# Patient Record
Sex: FEMALE | Race: WHITE | NOT HISPANIC OR LATINO | Employment: FULL TIME | URBAN - METROPOLITAN AREA
[De-identification: names, ages, dates, MRNs, and addresses within clinical notes are randomized per-mention and may not be internally consistent; named-entity substitution may affect disease eponyms.]

---

## 2020-03-10 ENCOUNTER — OFFICE VISIT (OUTPATIENT)
Dept: URGENT CARE | Facility: CLINIC | Age: 45
End: 2020-03-10
Payer: COMMERCIAL

## 2020-03-10 VITALS
HEART RATE: 92 BPM | RESPIRATION RATE: 16 BRPM | SYSTOLIC BLOOD PRESSURE: 128 MMHG | OXYGEN SATURATION: 100 % | WEIGHT: 183 LBS | DIASTOLIC BLOOD PRESSURE: 92 MMHG | TEMPERATURE: 98.6 F

## 2020-03-10 DIAGNOSIS — J18.9 COMMUNITY ACQUIRED PNEUMONIA, UNSPECIFIED LATERALITY: Primary | ICD-10-CM

## 2020-03-10 PROCEDURE — 99203 OFFICE O/P NEW LOW 30 MIN: CPT | Performed by: PHYSICIAN ASSISTANT

## 2020-03-10 RX ORDER — AZITHROMYCIN 250 MG/1
TABLET, FILM COATED ORAL
Qty: 6 TABLET | Refills: 0 | Status: SHIPPED | OUTPATIENT
Start: 2020-03-10 | End: 2020-03-14

## 2020-03-10 RX ORDER — CYCLOBENZAPRINE HCL 5 MG
TABLET ORAL
COMMUNITY
Start: 2020-02-20

## 2020-03-10 RX ORDER — FLUTICASONE FUROATE 27.5 UG/1
SPRAY, METERED NASAL
COMMUNITY
Start: 2020-02-06

## 2020-03-10 RX ORDER — BENZONATATE 100 MG/1
200 CAPSULE ORAL 3 TIMES DAILY PRN
Qty: 30 CAPSULE | Refills: 0 | Status: SHIPPED | OUTPATIENT
Start: 2020-03-10

## 2020-03-10 NOTE — PROGRESS NOTES
[facility]        NAME: Claudia Alas is a 41 y o  female  : 1975    MRN: [id number]  DATE: March 10, 2020  TIME: 7:15 PM    Assessment and Plan   Community acquired pneumonia, unspecified laterality [J18 9]  1  Community acquired pneumonia, unspecified laterality  azithromycin (ZITHROMAX) 250 mg tablet    benzonatate (TESSALON PERLES) 100 mg capsule         Patient Instructions   CAP:   -There is diffuse mild crackles heard on exam concerning for CAP  Will treat as per clinical evaluation with Azithromycin 250mg taken as prescribed  Take with food and a probiotic    -Fluticasone Nasal Spray for PND and congestion  Tessalon perles for the cough  -You can use OTC zyrtec or claritin  You can OTC mucinex  -Use a humidifier next to your bed  Take steam showers  -You can take Advil or Tylenol for fever or pain  -Stay very well hydrated and rest   -If your symptoms persist or worsen follow up immediately with your PCP  If your sx become acutely worse go to the ED  Follow up with PCP in 3-5 days  Proceed to  ER if symptoms worsen  Chief Complaint     Chief Complaint   Patient presents with    Cold Like Symptoms     pt presnts with productive cough, stuffy nose, chest congestion, started on Saturday         History of Present Illness       The patient presents today for a three day hx of productive cough, rhinorrhea, nasal congestion  The patient states that her sx began with a dry cough, rhinorrhea, PND and congestion  She states that the cough is now productive of a yellow sputum  She denies fever, chills, myalgias, dyspnea, wheezing, chest pain, chest tightness, palpitations  She denies sick contacts or recent travel  She denies a hx of asthma or smoking  She has lanre taking mucinex and tylenol cold and sinus with moderate relief  Review of Systems   Review of Systems   Constitutional: Negative for activity change, appetite change, chills, diaphoresis, fatigue and fever     HENT: Positive for congestion, postnasal drip and rhinorrhea  Negative for ear discharge, ear pain, facial swelling, hearing loss, sinus pressure, sinus pain, sneezing, sore throat, tinnitus, trouble swallowing and voice change  Respiratory: Positive for cough  Negative for chest tightness, shortness of breath, wheezing and stridor  Cardiovascular: Negative for chest pain, palpitations and leg swelling  Gastrointestinal: Negative for abdominal pain, diarrhea, nausea and vomiting  Musculoskeletal: Negative for arthralgias, joint swelling, myalgias, neck pain and neck stiffness  Skin: Negative for rash  Allergic/Immunologic: Negative for immunocompromised state  Neurological: Negative for dizziness, weakness, light-headedness, numbness and headaches  Hematological: Negative for adenopathy           Current Medications       Current Outpatient Medications:     cyclobenzaprine (FLEXERIL) 5 mg tablet, TK 1 T PO  Q 8 H PRN FOR SPASM, Disp: , Rfl:     FLONASE SENSIMIST 27 5 MCG/SPRAY nasal spray, TAKE 2 SPRAYS PER NOSTRIL DAILY, Disp: , Rfl:     azithromycin (ZITHROMAX) 250 mg tablet, Take 2 tablets today then 1 tablet daily x 4 days, Disp: 6 tablet, Rfl: 0    benzonatate (TESSALON PERLES) 100 mg capsule, Take 2 capsules (200 mg total) by mouth 3 (three) times a day as needed for cough, Disp: 30 capsule, Rfl: 0    Current Allergies     Allergies as of 03/10/2020 - Reviewed 03/10/2020   Allergen Reaction Noted    Levaquin [levofloxacin]  03/10/2020    Penicillins  03/10/2020            The following portions of the patient's history were reviewed and updated as appropriate: allergies, current medications, past family history, past medical history, past social history, past surgical history and problem list      Past Medical History:   Diagnosis Date    Chronic pain     back x 2 surgeries       Past Surgical History:   Procedure Laterality Date     SECTION      SALPINGECTOMY      bilateral    SPINE SURGERY         Family History   Problem Relation Age of Onset    Hypertension Mother     Hypertension Father     Hodgkin's lymphoma Father          Medications have been verified  Objective   /92   Pulse 92   Temp 98 6 °F (37 °C)   Resp 16   Wt 83 kg (183 lb)   LMP 03/10/2020   SpO2 100%        Physical Exam     Physical Exam   Constitutional: She is oriented to person, place, and time  She appears well-developed and well-nourished  No distress  HENT:   Head: Normocephalic and atraumatic  Right Ear: Hearing, tympanic membrane, external ear and ear canal normal    Left Ear: Hearing, tympanic membrane, external ear and ear canal normal    Nose: Mucosal edema and rhinorrhea present  Right sinus exhibits no maxillary sinus tenderness and no frontal sinus tenderness  Left sinus exhibits no maxillary sinus tenderness and no frontal sinus tenderness  Mouth/Throat: Uvula is midline, oropharynx is clear and moist and mucous membranes are normal  No uvula swelling  No oropharyngeal exudate, posterior oropharyngeal edema, posterior oropharyngeal erythema or tonsillar abscesses  Neck: Normal range of motion  Neck supple  Cardiovascular: Normal rate, regular rhythm, S1 normal and S2 normal  Exam reveals no gallop, no S3, no S4, no distant heart sounds and no friction rub  No murmur heard  Pulmonary/Chest: No accessory muscle usage  No tachypnea and no bradypnea  No respiratory distress  She has decreased breath sounds  She has no wheezes  She has rhonchi in the right upper field, the right middle field, the right lower field, the left upper field, the left middle field and the left lower field  She has no rales  Lymphadenopathy:     She has no cervical adenopathy  Neurological: She is alert and oriented to person, place, and time  Skin: She is not diaphoretic  Psychiatric: She has a normal mood and affect   Her behavior is normal    Nursing note and vitals reviewed

## 2021-06-22 NOTE — PATIENT INSTRUCTIONS
Community Acquired Pneumonia   WHAT YOU NEED TO KNOW:   Community-acquired pneumonia (CAP) is a lung infection that you get outside of a hospital or nursing home setting  Your lungs become inflamed and cannot work well  CAP may be caused by bacteria, viruses, or fungi  DISCHARGE INSTRUCTIONS:   Return to the emergency department if:   · You are confused and cannot think clearly  · You have increased trouble breathing  · Your lips or fingernails turn gray or blue  Contact your healthcare provider if:   · Your symptoms do not get better, or they get worse  · You are urinating less, or not at all  · You have questions or concerns about your condition or care  Medicines:   · Medicines  may be given to treat a bacterial, viral, or fungal infection  You may also be given medicines to dilate your bronchial tubes to help you breathe more easily  · Take your medicine as directed  Contact your healthcare provider if you think your medicine is not helping or if you have side effects  Tell him or her if you are allergic to any medicine  Keep a list of the medicines, vitamins, and herbs you take  Include the amounts, and when and why you take them  Bring the list or the pill bottles to follow-up visits  Carry your medicine list with you in case of an emergency  Follow up with your healthcare provider within 3 days or as directed: You may need another x-ray  Write down your questions so you remember to ask them during your visits  Deep breathing and coughing:  Deep breathing helps open the air passages in your lungs  Coughing helps bring up mucus from your lungs  Take a deep breath and hold the breath as long as you can  Then push the air out of your lungs with a deep, strong cough  Spit out any mucus you have coughed up  Take 10 deep breaths in a row every hour that you are awake  Remember to follow each deep breath with a cough     Do not smoke or allow others to smoke around you:  Nicotine and other chemicals in cigarettes and cigars can cause lung damage  Ask your healthcare provider for information if you currently smoke and need help to quit  E-cigarettes or smokeless tobacco still contain nicotine  Talk to your healthcare provider before you use these products  Manage CAP at home:   · Breathe warm, moist air  This helps loosen mucus  Loosely place a warm, wet washcloth over your nose and mouth  A room humidifier may also help make the air moist     · Drink liquids as directed  Ask your healthcare provider how much liquid to drink each day and which liquids to drink  Liquids help make mucus thin and easier to get out of your body  · Gently tap your chest   This helps loosen mucus so it is easier to cough  Lie with your head lower than your chest several times a day and tap your chest      · Get plenty of rest   Rest helps your body heal   Prevent CAP:   · Wash your hands often with soap and water  Carry germ-killing hand gel with you  You can use the gel to clean your hands when soap and water are not available  Do not touch your eyes, nose, or mouth unless you have washed your hands first      · Clean surfaces often  Clean doorknobs, countertops, cell phones, and other surfaces that are touched often  · Always cover your mouth when you cough  Cough into a tissue or your shirtsleeve so you do not spread germs from your hands  · Try to avoid people who have a cold or the flu  If you are sick, stay away from others as much as possible  · Ask about vaccines  You may need a vaccine to help prevent pneumonia  Get an influenza (flu) vaccine every year as soon as it becomes available  © 2017 [employer] Information is for End User's use only and may not be sold, redistributed or otherwise used for commercial purposes  All illustrations and images included in CareNotes® are the copyrighted property of [employer]  or Esteban Castro    The above information is an  only  It is not intended as medical advice for individual conditions or treatments  Talk to your doctor, nurse or pharmacist before following any medical regimen to see if it is safe and effective for you  CAP:   -There is diffuse mild crackles heard on exam concerning for CAP  Will treat as per clinical evaluation with Azithromycin 250mg taken as prescribed  Take with food and a probiotic    -Fluticasone Nasal Spray for PND and congestion  Tessalon perles for the cough  -You can use OTC zyrtec or claritin  You can OTC mucinex  -Use a humidifier next to your bed  Take steam showers  -You can take Advil or Tylenol for fever or pain  -Stay very well hydrated and rest   -If your symptoms persist or worsen follow up immediately with your PCP  If your sx become acutely worse go to the ED  Minocycline Counseling: Patient advised regarding possible photosensitivity and discoloration of the teeth, skin, lips, tongue and gums.  Patient instructed to avoid sunlight, if possible.  When exposed to sunlight, patients should wear protective clothing, sunglasses, and sunscreen.  The patient was instructed to call the office immediately if the following severe adverse effects occur:  hearing changes, easy bruising/bleeding, severe headache, or vision changes.  The patient verbalized understanding of the proper use and possible adverse effects of minocycline.  All of the patient's questions and concerns were addressed.

## 2024-02-21 PROBLEM — J18.9 COMMUNITY ACQUIRED PNEUMONIA: Status: RESOLVED | Noted: 2020-03-10 | Resolved: 2024-02-21

## 2025-04-10 ENCOUNTER — OFFICE VISIT (OUTPATIENT)
Dept: URGENT CARE | Facility: CLINIC | Age: 50
End: 2025-04-10
Payer: COMMERCIAL

## 2025-04-10 VITALS
DIASTOLIC BLOOD PRESSURE: 72 MMHG | WEIGHT: 163.6 LBS | OXYGEN SATURATION: 98 % | TEMPERATURE: 99.5 F | RESPIRATION RATE: 18 BRPM | SYSTOLIC BLOOD PRESSURE: 128 MMHG | HEART RATE: 97 BPM

## 2025-04-10 DIAGNOSIS — R39.9 UTI SYMPTOMS: Primary | ICD-10-CM

## 2025-04-10 LAB
SL AMB  POCT GLUCOSE, UA: ABNORMAL
SL AMB LEUKOCYTE ESTERASE,UA: ABNORMAL
SL AMB POCT BILIRUBIN,UA: ABNORMAL
SL AMB POCT BLOOD,UA: ABNORMAL
SL AMB POCT CLARITY,UA: ABNORMAL
SL AMB POCT COLOR,UA: YELLOW
SL AMB POCT KETONES,UA: ABNORMAL
SL AMB POCT NITRITE,UA: ABNORMAL
SL AMB POCT PH,UA: 6.5
SL AMB POCT SPECIFIC GRAVITY,UA: 1
SL AMB POCT URINE PROTEIN: ABNORMAL
SL AMB POCT UROBILINOGEN: 0.2

## 2025-04-10 PROCEDURE — 87086 URINE CULTURE/COLONY COUNT: CPT | Performed by: PHYSICIAN ASSISTANT

## 2025-04-10 PROCEDURE — 99213 OFFICE O/P EST LOW 20 MIN: CPT | Performed by: PHYSICIAN ASSISTANT

## 2025-04-10 PROCEDURE — 81002 URINALYSIS NONAUTO W/O SCOPE: CPT | Performed by: PHYSICIAN ASSISTANT

## 2025-04-10 RX ORDER — PHENTERMINE AND TOPIRAMATE 7.5; 46 MG/1; MG/1
CAPSULE, EXTENDED RELEASE ORAL
COMMUNITY
Start: 2025-02-19

## 2025-04-10 RX ORDER — SULFAMETHOXAZOLE AND TRIMETHOPRIM 800; 160 MG/1; MG/1
1 TABLET ORAL EVERY 12 HOURS SCHEDULED
Qty: 10 TABLET | Refills: 0 | Status: SHIPPED | OUTPATIENT
Start: 2025-04-10 | End: 2025-04-15

## 2025-04-10 RX ORDER — ALPRAZOLAM 0.25 MG
TABLET ORAL
COMMUNITY
Start: 2025-03-20

## 2025-04-10 RX ORDER — ALBUTEROL SULFATE 90 UG/1
INHALANT RESPIRATORY (INHALATION)
COMMUNITY
Start: 2025-03-20

## 2025-04-10 NOTE — PATIENT INSTRUCTIONS
"-The patients hx and sx are most consistent with an acute uncomplicated UTI. Will treat with Bactrim DS taken as prescribed. Take with food and a probiotic. She has no fever, chills, body aches. No flank pain or CVA tenderness as per patient.  -U/A showed leukocytes. Urine cx ordered today. Call in 48 hours for your results.   -We discussed Uqora supplements or D-Mannose for bladder health and prevention   -Warm heating pad on the abdomen or sitz bath for comfort  -Avoid bubble bath/bath oils. Caffeine and alcohol may irritate the bladder.   -Empty bladder immediately before and following intercourse. Avoid \"holding urine.\"  -AZO/Uricalm for pain control, do not take for more than 48 hours as this can mask worsening symptoms.   -Stay VERY well hydrated and push fluids. You want your urine clear.   -Prevention and precautions discussed  -If your sx worsen or persists, see your PCP or OBGYN immediately as discussed. Red flag signs discussed in depth. Patient expresses understanding.       "

## 2025-04-10 NOTE — PROGRESS NOTES
"  [facility]        NAME: Renee Prasad is a 50 y.o. female  : 1975    MRN: [id number]  DATE: April 10, 2025  TIME: 3:57 PM    Assessment and Plan   UTI symptoms [R39.9]  1. UTI symptoms  POCT urine dip    Urine culture    sulfamethoxazole-trimethoprim (BACTRIM DS) 800-160 mg per tablet            Patient Instructions   -The patients hx and sx are most consistent with an acute uncomplicated UTI. Will treat with Bactrim DS taken as prescribed. Take with food and a probiotic. She has no fever, chills, body aches. No flank pain or CVA tenderness as per patient.  -U/A showed leukocytes. Urine cx ordered today. Call in 48 hours for your results.   -We discussed Uqora supplements or D-Mannose for bladder health and prevention   -Warm heating pad on the abdomen or sitz bath for comfort  -Avoid bubble bath/bath oils. Caffeine and alcohol may irritate the bladder.   -Empty bladder immediately before and following intercourse. Avoid \"holding urine.\"  -AZO/Uricalm for pain control, do not take for more than 48 hours as this can mask worsening symptoms.   -Stay VERY well hydrated and push fluids. You want your urine clear.   -Prevention and precautions discussed  -If your sx worsen or persists, see your PCP or OBGYN immediately as discussed. Red flag signs discussed in depth. Patient expresses understanding.         Follow up with PCP in 3-5 days.  Proceed to  ER if symptoms worsen.    If tests have been performed at [facility], our office will contact you with results if changes need to be made to the care plan discussed with you at the visit.  [facility]    Chief Complaint     Chief Complaint   Patient presents with    Possible UTI     Pt here for concerns of a UTI  pt states  s/s   5 days , freq, pain,  discharge.  No meds used.          History of Present Illness       The patient presents today for a five day hx of urinary frequency, urinary urgency, dysuria and " reported increased discharge. She states that this only occurred one day. She states that her main sx is the urinary frequency and  dysuria. No OTC measures attempted. She has no fever, chills, body aches. No nausea, vomiting, diarrhea. No flank pain, abdominal pain, pelvic pain. No hematuria. No abnormal vaginal bleeding. No vaginal pruritis or irritation. She had a partial hysterectomy April 2024.       Review of Systems   Review of Systems   Constitutional:  Negative for activity change, appetite change, chills, fatigue and fever.   Respiratory:  Negative for cough, chest tightness, shortness of breath and wheezing.    Cardiovascular:  Negative for chest pain and palpitations.   Gastrointestinal:  Negative for abdominal distention, abdominal pain, blood in stool, constipation, diarrhea, nausea and vomiting.   Genitourinary:  Positive for dysuria, frequency and urgency. Negative for decreased urine volume, difficulty urinating, dyspareunia, enuresis, flank pain, genital sores, hematuria, menstrual problem, pelvic pain, vaginal bleeding, vaginal discharge and vaginal pain.   Musculoskeletal:  Negative for arthralgias and myalgias.   Skin:  Negative for rash.   Hematological:  Negative for adenopathy. Does not bruise/bleed easily.         Current Medications       Current Outpatient Medications:     albuterol (PROVENTIL HFA,VENTOLIN HFA) 90 mcg/act inhaler, TAKE 2 PUFFS BY MOUTH EVERY 4 TO 6 HOURS AS NEEDED, Disp: , Rfl:     ALPRAZolam (XANAX) 0.25 mg tablet, TAKE 1/2 - 1 TABLET BY ORAL ROUTE 1 TIME EVERY DAY AS NEEDED FOR ANXIETY ATTACK AS NEEDED, Disp: , Rfl:     FLONASE SENSIMIST 27.5 MCG/SPRAY nasal spray, TAKE 2 SPRAYS PER NOSTRIL DAILY, Disp: , Rfl:     Qsymia 7.5-46 MG CP24, take 1 capsule by mouth every day in the morning, Disp: , Rfl:     sulfamethoxazole-trimethoprim (BACTRIM DS) 800-160 mg per tablet, Take 1 tablet by mouth every 12 (twelve) hours for 5 days, Disp: 10 tablet, Rfl: 0    Current Allergies      Allergies as of 04/10/2025 - Reviewed 04/10/2025   Allergen Reaction Noted    Banana extract allergy skin test - food allergy Anaphylaxis 2022    Cefdinir Nausea Only and Headache 04/10/2025    Levaquin [levofloxacin] Other (See Comments) 03/10/2020    Penicillins Other (See Comments) 03/10/2020    Hydrocodone-acetaminophen Itching and Rash 2022    Latex Rash 2022            The following portions of the patient's history were reviewed and updated as appropriate: allergies, current medications, past family history, past medical history, past social history, past surgical history and problem list.     Past Medical History:   Diagnosis Date    Chronic pain     back x 2 surgeries       Past Surgical History:   Procedure Laterality Date     SECTION      SALPINGECTOMY      bilateral    SPINE SURGERY         Family History   Problem Relation Age of Onset    Hypertension Mother     Hypertension Father     Hodgkin's lymphoma Father          Medications have been verified.        Objective   /72   Pulse 97   Temp 99.5 °F (37.5 °C) (Tympanic)   Resp 18   Wt 74.2 kg (163 lb 9.6 oz)   SpO2 98%   No LMP recorded.       Physical Exam     Physical Exam  Vitals and nursing note reviewed.   Constitutional:       General: She is not in acute distress.     Appearance: Normal appearance. She is well-developed. She is not ill-appearing, toxic-appearing or diaphoretic.   Cardiovascular:      Rate and Rhythm: Normal rate and regular rhythm.      Heart sounds: Normal heart sounds.   Pulmonary:      Effort: Pulmonary effort is normal.      Breath sounds: Normal breath sounds.   Abdominal:      General: Abdomen is flat. Bowel sounds are normal. There is no distension.      Palpations: Abdomen is soft. Abdomen is not rigid.      Tenderness: There is no abdominal tenderness. There is no right CVA tenderness, left CVA tenderness, guarding or rebound. Negative signs include Pimentel's sign and McBurney's  sign.      Hernia: No hernia is present.   Skin:     General: Skin is warm and dry.      Capillary Refill: Capillary refill takes less than 2 seconds.   Psychiatric:         Behavior: Behavior normal.

## 2025-04-11 LAB — BACTERIA UR CULT: NORMAL
